# Patient Record
Sex: MALE | Race: BLACK OR AFRICAN AMERICAN | Employment: OTHER | URBAN - METROPOLITAN AREA
[De-identification: names, ages, dates, MRNs, and addresses within clinical notes are randomized per-mention and may not be internally consistent; named-entity substitution may affect disease eponyms.]

---

## 2018-07-06 PROBLEM — E66.01 SEVERE OBESITY (BMI 35.0-39.9): Status: ACTIVE | Noted: 2018-07-06

## 2019-05-02 ENCOUNTER — OFFICE VISIT (OUTPATIENT)
Dept: CARDIOLOGY CLINIC | Age: 69
End: 2019-05-02

## 2019-05-02 VITALS
DIASTOLIC BLOOD PRESSURE: 91 MMHG | HEART RATE: 83 BPM | WEIGHT: 253 LBS | SYSTOLIC BLOOD PRESSURE: 146 MMHG | OXYGEN SATURATION: 96 %

## 2019-05-02 DIAGNOSIS — E78.00 PURE HYPERCHOLESTEROLEMIA: ICD-10-CM

## 2019-05-02 DIAGNOSIS — I10 ESSENTIAL HYPERTENSION WITH GOAL BLOOD PRESSURE LESS THAN 140/90: Primary | ICD-10-CM

## 2019-05-02 RX ORDER — ERGOCALCIFEROL 1.25 MG/1
50000 CAPSULE ORAL
COMMUNITY

## 2019-05-02 RX ORDER — CARVEDILOL 6.25 MG/1
6.25 TABLET ORAL 2 TIMES DAILY WITH MEALS
Qty: 60 TAB | Refills: 0 | Status: SHIPPED | OUTPATIENT
Start: 2019-05-02 | End: 2019-05-03

## 2019-05-02 RX ORDER — ALBUTEROL SULFATE 90 UG/1
AEROSOL, METERED RESPIRATORY (INHALATION)
COMMUNITY

## 2019-05-02 RX ORDER — ATORVASTATIN CALCIUM 40 MG/1
TABLET, FILM COATED ORAL DAILY
COMMUNITY

## 2019-05-02 RX ORDER — TAMSULOSIN HYDROCHLORIDE 0.4 MG/1
0.4 CAPSULE ORAL DAILY
COMMUNITY

## 2019-05-02 RX ORDER — ASPIRIN 81 MG/1
TABLET ORAL DAILY
COMMUNITY

## 2019-05-02 RX ORDER — SPIRONOLACTONE 25 MG/1
TABLET ORAL DAILY
COMMUNITY

## 2019-05-02 RX ORDER — AMLODIPINE BESYLATE 5 MG/1
5 TABLET ORAL DAILY
COMMUNITY
End: 2019-05-02 | Stop reason: ALTCHOICE

## 2019-05-02 RX ORDER — CARVEDILOL 6.25 MG/1
TABLET ORAL 2 TIMES DAILY WITH MEALS
COMMUNITY
End: 2019-05-02 | Stop reason: SDUPTHER

## 2019-05-02 RX ORDER — CLONIDINE HYDROCHLORIDE 0.1 MG/1
TABLET ORAL 2 TIMES DAILY
COMMUNITY

## 2019-05-02 NOTE — PROGRESS NOTES
1. Have you been to the ER, urgent care clinic since your last visit? Hospitalized since your last visit? No     2. Have you seen or consulted any other health care providers outside of the [address] [place] [address] since your last visit? Include any pap smears or colon screening.   yes

## 2019-05-02 NOTE — TELEPHONE ENCOUNTER
PCP: No primary care provider on file. Last appt: 5/2/2019  Future Appointments   Date Time Provider Taylor Tomas   5/30/2019  2:30 PM Gillian Mason, [facility] [address]       Requested Prescriptions     Pending Prescriptions Disp Refills    carvedilol (COREG) 6.25 mg tablet 180 Tab 3     Sig: Take 1 Tab by mouth two (2) times daily (with meals). Other Comments:  Pt request a 90 day supply be sent to [place] [id number] as well as local pharmacy.

## 2019-05-02 NOTE — PROGRESS NOTES
[facility]      Mr. Claudene Montane  has a history of hypertension and CVA in April 2019. He was sent for evaluation of hypertension. He is currently on regimen on HCTZ, Amlodipine, Clonidine and Spironolactone. He denies any history of CAD or CHF. Denies afib or palpitations. He denies any chest pain or tightness, shortness of breath, orthopnea, palpitations or PND. He has mild LE edema, left greater than right, this has been present for two years. He has not had any history of recent travel or surgery. He denies orthopnea or PND. He has mild left sided weakness from recent CVA. Denies any nausea, vomiting, abdominal pain, fever, chills, sputum production. No hematuria or other bleeding complaints    No past medical history on file. No past surgical history on file. Current Outpatient Medications   Medication Sig    albuterol (PROVENTIL HFA, VENTOLIN HFA, PROAIR HFA) 90 mcg/actuation inhaler Take  by inhalation.  amLODIPine (NORVASC) 5 mg tablet Take 5 mg by mouth daily.  aspirin delayed-release 81 mg tablet Take  by mouth daily.  atorvastatin (LIPITOR) 40 mg tablet Take  by mouth daily.  multivitamin, tx-iron-ca-min (THERA-M W/ IRON) 9 mg iron-400 mcg tab tablet Take 1 Tab by mouth daily.  cloNIDine HCl (CATAPRES) 0.1 mg tablet Take  by mouth two (2) times a day.  ergocalciferol (VITAMIN D2) 50,000 unit capsule Take 50,000 Units by mouth.  fluticasone propionate (FLONASE NA) by Nasal route.  spironolactone (ALDACTONE) 25 mg tablet Take  by mouth daily.  tamsulosin (FLOMAX) 0.4 mg capsule Take 0.4 mg by mouth daily.  HYDROCHLOROTHIAZIDE PO Take  by mouth. No current facility-administered medications for this visit. Allergies and Sensitivities:  Allergies not on file    Family History:  No family history on file.     Social History:  Social History     Tobacco Use    Smoking status: Never Smoker  Smokeless tobacco: Never Used   Substance Use Topics    Alcohol use: Not on file    Drug use: Not on file     He  reports that he has never smoked. He has never used smokeless tobacco.  He  has no alcohol history on file. Review of Systems:  Cardiac symptoms as noted above in HPI. All others negative. Denies fatigue, malaise, skin rash, joint pain, blurring vision, photophobia, neck pain, hemoptysis, chronic cough, nausea, vomiting, hematuria, burning micturition, BRBPR, chronic headaches. Physical Exam:  BP Readings from Last 3 Encounters:   05/02/19 (!) 146/91         Pulse Readings from Last 3 Encounters:   05/02/19 83          Wt Readings from Last 3 Encounters:   05/02/19 253 lb (114.8 kg)       Constitutional: Oriented to person, place, and time. HENT: Head: Normocephalic and atraumatic. Eyes: Conjunctivae and extraocular motions are normal.   Neck: No JVD present. Carotid bruit is not appreciated. Cardiovascular: Regular rhythm. No murmur, gallop or rubs appreciated  Lung: Breath sounds normal. No respiratory distress. No ronchi or rales appreciated  Abdominal: No tenderness. No rebound and no guarding. Musculoskeletal: There is no lower extremity edema. No cynosis  Neurological: No gross motor deficit noted. Skin: No visible skin rash noted. Psychiatric: Normal mood and affect. Review of Data  LABS:   No results found for: NA, K, CL, CO2, GLU, BUN, CREA  No flowsheet data found. No results found for: GPT, ALT  No results found for: HBA1C, HGBE8, YNK4HQKR, IVQ1JMGD    EKG    ECHO 04/08/19 ([facility])  EF 60% and no wall motion abnormalities. Mild diastolic dysfunction. IMPRESSION & PLAN:    Hypertension: He is on regimen of Clonidine, HCTZ, Spironolactone and Amlodipine. Stop Amlodipine and start Coreg 6.25 mg BID. Follow up in one month to assess if regimen needs titration. Recent CVA: Mild residual left sided weakness. Continue follow up with neurology.  He is on ASA and statin. Negative bubble study on echo 03/19. He is in sinus on exam.     Trace LE edema, left greater than right: This has been present for at least two years, his wife describes that he has a negative PVL done at [facility] just one month ago. Reassurance offered. Continue with Spironolactone and HCTZ. No recent surgery or travel. Importance of diet and exercise was discussed with patient. This plan was discussed with patient who is in agreement. Thank you for allowing me to participate in patient care. Please feel free to call me if you have any question or concern. Pito Hollis MD  Please note: This document has been produced using voice recognition software. Unrecognized errors in transcription may be present.

## 2019-05-03 ENCOUNTER — TELEPHONE (OUTPATIENT)
Dept: CARDIOLOGY CLINIC | Age: 69
End: 2019-05-03

## 2019-05-03 RX ORDER — AMLODIPINE BESYLATE 5 MG/1
5 TABLET ORAL DAILY
COMMUNITY

## 2019-05-03 RX ORDER — CARVEDILOL 6.25 MG/1
6.25 TABLET ORAL 2 TIMES DAILY WITH MEALS
Qty: 180 TAB | Refills: 3 | Status: SHIPPED | OUTPATIENT
Start: 2019-05-03 | End: 2019-05-03

## 2019-05-03 NOTE — TELEPHONE ENCOUNTER
Patient caregiver called and states that she gave him one dose of coreg this morning, and his blood pressure is going higher than normal and he is so tired he cant get out of the bed. She says this medication is not going to work for him. She is not giving him anymore of it and she is putting him back on Norvasc. He has been on norvasc for years. And it has always worked for him. Advised will send message to Dr. Earnest Otoole to make him aware.

## 2021-07-22 ENCOUNTER — OFFICE VISIT (OUTPATIENT)
Dept: CARDIOLOGY CLINIC | Age: 71
End: 2021-07-22
Payer: MEDICARE

## 2021-07-22 VITALS
HEIGHT: 69 IN | RESPIRATION RATE: 18 BRPM | OXYGEN SATURATION: 94 % | BODY MASS INDEX: 39.4 KG/M2 | WEIGHT: 266 LBS | DIASTOLIC BLOOD PRESSURE: 83 MMHG | SYSTOLIC BLOOD PRESSURE: 144 MMHG | HEART RATE: 98 BPM

## 2021-07-22 DIAGNOSIS — R94.31 ABNORMAL EKG: Primary | ICD-10-CM

## 2021-07-22 PROCEDURE — 3017F COLORECTAL CA SCREEN DOC REV: CPT | Performed by: INTERNAL MEDICINE

## 2021-07-22 PROCEDURE — G8536 NO DOC ELDER MAL SCRN: HCPCS | Performed by: INTERNAL MEDICINE

## 2021-07-22 PROCEDURE — G8417 CALC BMI ABV UP PARAM F/U: HCPCS | Performed by: INTERNAL MEDICINE

## 2021-07-22 PROCEDURE — G8428 CUR MEDS NOT DOCUMENT: HCPCS | Performed by: INTERNAL MEDICINE

## 2021-07-22 PROCEDURE — 99214 OFFICE O/P EST MOD 30 MIN: CPT | Performed by: INTERNAL MEDICINE

## 2021-07-22 PROCEDURE — 1101F PT FALLS ASSESS-DOCD LE1/YR: CPT | Performed by: INTERNAL MEDICINE

## 2021-07-22 PROCEDURE — G8510 SCR DEP NEG, NO PLAN REQD: HCPCS | Performed by: INTERNAL MEDICINE

## 2021-07-22 RX ORDER — CARVEDILOL 3.12 MG/1
3.12 TABLET ORAL 2 TIMES DAILY WITH MEALS
Qty: 60 TABLET | Refills: 3 | Status: SHIPPED | OUTPATIENT
Start: 2021-07-22 | End: 2021-11-11

## 2021-07-22 NOTE — PROGRESS NOTES
[facility]      Mr. Edita Hamilton  has a history of hypertension and CVA in April 2019. Patient is here today for follow-up appointment. He is accompanied with the significant other. Currently he is taking amlodipine, clonidine, Aldactone, hydrochlorothiazide. According to spouse blood pressure runs between 140180  Patient recently went to the hospital with some restriction of blood pressure at home and during the visit he complained of some reflux symptoms. His nuclear stress test and CAT scan was unremarkable  Denies any nausea, vomiting, abdominal pain, fever, chills, sputum production. No hematuria or other bleeding complaints    Past Medical History:   Diagnosis Date    HTN (hypertension)     Hypercholesteremia     Prostate cancer ([facility] [address] 2/12/09    [id number] kandice grade 3+4 (1 core), 3+3 (3 cores) Adenocarcinoma of the Prostate presenting with PSA 10.5, s/p EBRT 6/2010, ADT 9/2009-3/2010         Past Surgical History:   Procedure Laterality Date    ENDOSCOPY, COLON, DIAGNOSTIC      HX UROLOGICAL  2/12/09    Prostate Bx, Dr. Chaya Holden       Current Outpatient Medications   Medication Sig    amLODIPine (NORVASC) 5 mg tablet Take 5 mg by mouth daily.  albuterol (PROVENTIL HFA, VENTOLIN HFA, PROAIR HFA) 90 mcg/actuation inhaler Take  by inhalation.  aspirin delayed-release 81 mg tablet Take  by mouth daily.  atorvastatin (LIPITOR) 40 mg tablet Take  by mouth daily.  multivitamin, tx-iron-ca-min (THERA-M W/ IRON) 9 mg iron-400 mcg tab tablet Take 1 Tab by mouth daily.  cloNIDine HCl (CATAPRES) 0.1 mg tablet Take  by mouth two (2) times a day.  ergocalciferol (VITAMIN D2) 50,000 unit capsule Take 50,000 Units by mouth.  fluticasone propionate (FLONASE NA) by Nasal route.  spironolactone (ALDACTONE) 25 mg tablet Take  by mouth daily.  tamsulosin (FLOMAX) 0.4 mg capsule Take 0.4 mg by mouth daily.     HYDROCHLOROTHIAZIDE PO Take  by mouth.  tamsulosin (FLOMAX) 0.4 mg capsule Take 1 Cap by mouth daily.  azithromycin (ZITHROMAX) 250 mg tablet     predniSONE (DELTASONE) 20 mg tablet     potassium chloride (K-DUR, KLOR-CON) 10 mEq tablet     famotidine (PEPCID) 20 mg tablet     ergocalciferol (ERGOCALCIFEROL) 50,000 unit capsule     cloNIDine HCl (CATAPRES) 0.1 mg tablet     amLODIPine (NORVASC) 10 mg tablet     atorvastatin (LIPITOR) 10 mg tablet     NEXIUM 40 mg capsule     fluticasone (FLONASE) 50 mcg/actuation nasal spray     spironolactone (ALDACTONE) 25 mg tablet     albuterol (PROAIR HFA) 90 mcg/actuation inhaler Take  by inhalation.  potassium 99 mg tablet Take 99 mg by mouth daily. No current facility-administered medications for this visit. Allergies and Sensitivities:  Allergies   Allergen Reactions    Lisinopril Anaphylaxis       Family History:  Family History   Problem Relation Age of Onset    Diabetes Mother     Hypertension Mother     Glaucoma Father     Cancer Father         prostate    Alcohol abuse Neg Hx     Arthritis-osteo Neg Hx     Asthma Neg Hx     Bleeding Prob Neg Hx     Elevated Lipids Neg Hx     Headache Neg Hx     Heart Disease Neg Hx     Lung Disease Neg Hx     Migraines Neg Hx     Psychiatric Disorder Neg Hx     Stroke Neg Hx     Mental Retardation Neg Hx        Social History:  Social History     Tobacco Use    Smoking status: Never Smoker    Smokeless tobacco: Never Used   Substance Use Topics    Alcohol use: No    Drug use: No     He  reports that he has never smoked. He has never used smokeless tobacco.  He  reports no history of alcohol use. Review of Systems:  Cardiac symptoms as noted above in HPI. All others negative.     Physical Exam:  BP Readings from Last 3 Encounters:   07/22/21 (!) 144/83   05/02/19 (!) 146/91   07/06/18 132/78         Pulse Readings from Last 3 Encounters:   07/22/21 98   05/02/19 83          Wt Readings from Last 3 Encounters:   07/22/21 120.7 kg (266 lb)   05/02/19 114.8 kg (253 lb)   07/06/18 119.7 kg (264 lb)       Constitutional: Oriented to person, place, and time. HENT: Head: Normocephalic and atraumatic. Neck: No JVD present. Cardiovascular: Regular rhythm. No murmur, gallop or rubs appreciated  Lung: Breath sounds normal. No respiratory distress. No ronchi or rales appreciated  Abdominal: No tenderness. No rebound and no guarding. Musculoskeletal: There is 1+ lower extremity edema. No cynosis        Review of Data  LABS:   No results found for: NA, K, CL, CO2, GLU, BUN, CREA  No flowsheet data found. No results found for: ALT  No results found for: HBA1C, GOH5JEZJ, AHW8FWLV, JVX4QWYA    EKG    ECHO 04/08/19 ([facility])  EF 60% and no wall motion abnormalities. Mild diastolic dysfunction. STRESS TEST (07/2021)   * This study is normal.     * This study is low risk.     * Stress SPECT tomographic images reveal homogeneous tracer uptake throughout the myocardium. Rest images show no significant change.     * ECG response is negative.     * Transient ischemic dilatation absent.     * Post stress images segmental wall thickening and motion are normal.     * Left ventricular function is normal  with calculated ejection fraction 66 %. IMPRESSION & PLAN:    Hypertension: He is on regimen of Clonidine, HCTZ, Spironolactone and Amlodipine. Blood pressure at home between 1 68502 systolic. Continue current medication. Will start patient on carvedilol 3.125 twice daily  -Checks blood pressure at home regularly  Will order echo to rule out hypertensive cardiovascular heart disease especially with lower extremity swelling without any evidence function    Hyperlipidemia:  Currently on atorvastatin. Continue same. This is being managed by PCP    Importance of diet and exercise was discussed with patient. This plan was discussed with patient who is in agreement.     Thank you for allowing me to participate in patient care. Please feel free to call me if you have any question or concern. Harmeet Cordova MD  Please note: This document has been produced using voice recognition software. Unrecognized errors in transcription may be present.

## 2021-07-22 NOTE — PATIENT INSTRUCTIONS
Echo in 3 months - abn ekg  Start Carvedilol (Coreg) 3.125mg twice daily   4-5 month follow up after echo

## 2021-07-22 NOTE — LETTER
7/22/2021    Patient: Marsa Lennox   YOB: 1950   Date of Visit: 7/22/2021     Wendy Rodriguez MD  [address] [place] [address] [address] [id number]  Via Fax: [phone]    Dear Wendy Rodriguez MD,      Thank you for referring Mr. Marsa Lennox to [facility] for evaluation. My notes for this consultation are attached. If you have questions, please do not hesitate to call me. I look forward to following your patient along with you.       Sincerely,    Goyo Rodriguez MD

## 2021-07-22 NOTE — PROGRESS NOTES
Zachery Espinoza presents today for   Chief Complaint   Patient presents with    New Patient     htn,cva       Zachery Espinoza preferred language for health care discussion is english/other. Personal Protective Equipment:   Personal Protective Equipment was used including: mask-surgical and hands-gloves. Patient was placed on no precaution(s). Patient was masked. Precautions:   Patient currently on None  Patient currently roomed with door closed    Is someone accompanying this pt? yes    Is the patient using any DME equipment during Paulene Flores? yes    Depression Screening:  3 most recent PHQ Screens 7/22/2021   Little interest or pleasure in doing things Not at all   Feeling down, depressed, irritable, or hopeless Not at all   Total Score PHQ 2 0       Learning Assessment:  No flowsheet data found. Abuse Screening:  No flowsheet data found. Fall Risk  Fall Risk Assessment, last 12 mths 7/22/2021   Able to walk? Yes   Fall in past 12 months? 0   Do you feel unsteady? 0   Are you worried about falling 0       Pt currently taking Anticoagulant therapy? no    Coordination of Care:  1. Have you been to the ER, urgent care clinic since your last visit? Hospitalized since your last visit? no    2. Have you seen or consulted any other health care providers outside of the [address] [place] [address] since your last visit? Include any pap smears or colon screening.  no

## 2021-11-11 RX ORDER — CARVEDILOL 3.12 MG/1
TABLET ORAL
Qty: 60 TABLET | Refills: 3 | Status: SHIPPED | OUTPATIENT
Start: 2021-11-11

## 2023-06-22 ENCOUNTER — TELEPHONE (OUTPATIENT)
Age: 73
End: 2023-06-22

## 2023-06-22 NOTE — TELEPHONE ENCOUNTER
Contacted pts wife at [facility] number ( pt present on speaker) . Two patient Identifiers confirmed. She stated they needed to review his EKG from 2021 and wanted information sent to [facility]. Advised pt to sign PHI and have [facility] fax to us and we will send to [facility] for his records to be sent to them. Pts wife  verbalized understanding.